# Patient Record
Sex: FEMALE | URBAN - METROPOLITAN AREA
[De-identification: names, ages, dates, MRNs, and addresses within clinical notes are randomized per-mention and may not be internally consistent; named-entity substitution may affect disease eponyms.]

---

## 2020-11-16 ENCOUNTER — APPOINTMENT (RX ONLY)
Dept: URBAN - METROPOLITAN AREA CLINIC 55 | Facility: CLINIC | Age: 45
Setting detail: DERMATOLOGY
End: 2020-11-16

## 2020-11-16 DIAGNOSIS — Z41.9 ENCOUNTER FOR PROCEDURE FOR PURPOSES OTHER THAN REMEDYING HEALTH STATE, UNSPECIFIED: ICD-10-CM

## 2020-11-16 PROCEDURE — ? COSMETIC CONSULTATION - KYBELLA

## 2020-11-16 PROCEDURE — ? COSMETIC CONSULTATION - ULTHERAPY

## 2020-12-02 ENCOUNTER — APPOINTMENT (RX ONLY)
Dept: URBAN - METROPOLITAN AREA CLINIC 55 | Facility: CLINIC | Age: 45
Setting detail: DERMATOLOGY
End: 2020-12-02

## 2020-12-02 DIAGNOSIS — Z41.9 ENCOUNTER FOR PROCEDURE FOR PURPOSES OTHER THAN REMEDYING HEALTH STATE, UNSPECIFIED: ICD-10-CM

## 2020-12-02 PROCEDURE — ? KYBELLA INJECTION

## 2020-12-02 PROCEDURE — ? BOTOX

## 2020-12-02 ASSESSMENT — LOCATION ZONE DERM
LOCATION ZONE: FACE
LOCATION ZONE: SCALP

## 2020-12-02 ASSESSMENT — LOCATION SIMPLE DESCRIPTION DERM
LOCATION SIMPLE: SUBMENTAL CHIN
LOCATION SIMPLE: SCALP

## 2020-12-02 ASSESSMENT — LOCATION DETAILED DESCRIPTION DERM
LOCATION DETAILED: SUBMENTAL CHIN
LOCATION DETAILED: LEFT CENTRAL OCCIPITAL SCALP

## 2020-12-02 NOTE — PROCEDURE: BOTOX
Show Topical Anesthesia: Yes
Masseter Units: 0
Price (Use Numbers Only, No Special Characters Or $): 053
Show Right And Left Pupillary Line Units: No
Expiration Date (Month Year): 09/30/23
Dilution (U/0.1 Cc): 4
Consent: Written consent obtained. Patient denies pregnancy and breastfeeding. Risks include but not limited to lid/brow ptosis, bruising, swelling, diplopia, temporary effect, incomplete chemical denervation.
Post-Care Instructions: Patient instructed to not lie down for 4 hours post procedure and informed not to manipulate treatment area for 4 hours. \\nRecommended follow up in 7 days if needed.
Lot #: [id number]
Detail Level: Detailed

## 2020-12-02 NOTE — PROCEDURE: KYBELLA INJECTION
Detail Level: Detailed
Treatment Area: Submental Chin
Consent: Written consent obtained. Risks include but not limited to bruising, beading, irregular texture, ulceration, infection, allergic reaction, scar formation, incomplete augmentation, temporary nature, procedural pain.
Treatment Number (Won't Render If 0): 1
Kybella Volume In Cc (Won't Render If 0): 0
Procedure Text: The treatment areas were cleansed. Kybella was administered using the parameters mentioned above.
Expiration Date (Month Year): 2/1/22
Anesthesia Volume In Cc: 0.5
Post-Care Instructions: Patient instructed to apply ice to reduce swelling.
Packages Used (Won't Render If 0 - Required If Using Inventory): 2
Lot #: [id number]

## 2021-01-06 ENCOUNTER — APPOINTMENT (RX ONLY)
Dept: URBAN - METROPOLITAN AREA CLINIC 55 | Facility: CLINIC | Age: 46
Setting detail: DERMATOLOGY
End: 2021-01-06

## 2021-01-06 DIAGNOSIS — Z41.9 ENCOUNTER FOR PROCEDURE FOR PURPOSES OTHER THAN REMEDYING HEALTH STATE, UNSPECIFIED: ICD-10-CM

## 2021-01-06 PROCEDURE — ? KYBELLA INJECTION

## 2021-01-06 NOTE — PROCEDURE: KYBELLA INJECTION
Expiration Date (Month Year): 2/2022
Procedure Text: The treatment areas were cleansed. Kybella was administered using the parameters mentioned above.
Number Of Grid Dots (Won't Render If 0): 0
Consent: Written consent obtained. Risks include but not limited to bruising, beading, irregular texture, ulceration, infection, allergic reaction, scar formation, incomplete augmentation, temporary nature, procedural pain.
Packages Used (Won't Render If 0 - Required If Using Inventory): 2
Lot #: [id number]
Treatment Area: Submental Chin
Detail Level: Detailed
Post-Care Instructions: Patient instructed to apply ice to reduce swelling.
Anesthesia Volume In Cc: 0.5

## 2021-02-10 ENCOUNTER — APPOINTMENT (RX ONLY)
Dept: URBAN - METROPOLITAN AREA CLINIC 55 | Facility: CLINIC | Age: 46
Setting detail: DERMATOLOGY
End: 2021-02-10

## 2021-02-10 DIAGNOSIS — Z41.9 ENCOUNTER FOR PROCEDURE FOR PURPOSES OTHER THAN REMEDYING HEALTH STATE, UNSPECIFIED: ICD-10-CM

## 2021-02-10 PROCEDURE — ? BOTOX

## 2021-02-10 PROCEDURE — ? KYBELLA INJECTION

## 2021-02-10 NOTE — PROCEDURE: KYBELLA INJECTION
Expiration Date (Month Year): 2/2022
Lot #: [id number]
Post-Care Instructions: Patient instructed to apply ice to reduce swelling.
Price (Use Numbers Only, No Special Characters Or $): 1200
Anesthesia Volume In Cc: 0.5
Consent: Written consent obtained. Risks include but not limited to bruising, beading, irregular texture, ulceration, infection, allergic reaction, scar formation, incomplete augmentation, temporary nature, procedural pain.
Treatment Number (Won't Render If 0): 3
Kybella Volume In Cc (Won't Render If 0): 0
Procedure Text: The treatment areas were cleansed. Kybella was administered using the parameters mentioned above.
Detail Level: Detailed
Treatment Area: Submental Chin
Packages Used (Won't Render If 0 - Required If Using Inventory): 2

## 2021-02-10 NOTE — PROCEDURE: BOTOX
Lot #: [id number]
Lateral Platysmal Bands Units: 0
Forehead Units: 4
Show Anterior Platysmal Band Units: Yes
Show Lcl Units: No
Glabellar Complex Units: 26
Additional Area 2 Location: chin
Price (Use Numbers Only, No Special Characters Or $): 102
Expiration Date (Month Year): 10/2023
Consent: Written consent obtained. Patient denies pregnancy and breastfeeding. Risks include but not limited to lid/brow ptosis, bruising, swelling, diplopia, temporary effect, incomplete chemical denervation.
Post-Care Instructions: Patient instructed to not lie down for 4 hours post procedure and informed not to manipulate treatment area for 4 hours. \\nRecommended follow up in 7 days if needed.
Detail Level: Detailed
Additional Area 1 Location: upper lip

## 2021-05-04 ENCOUNTER — APPOINTMENT (RX ONLY)
Dept: URBAN - METROPOLITAN AREA CLINIC 55 | Facility: CLINIC | Age: 46
Setting detail: DERMATOLOGY
End: 2021-05-04

## 2021-05-04 DIAGNOSIS — Z41.9 ENCOUNTER FOR PROCEDURE FOR PURPOSES OTHER THAN REMEDYING HEALTH STATE, UNSPECIFIED: ICD-10-CM

## 2021-05-04 PROCEDURE — ? DYSPORT

## 2021-05-04 NOTE — PROCEDURE: DYSPORT
Show Right And Left Pupillary Line Units: No
Consent: Written consent obtained. Risks include but not limited to lid/brow ptosis, bruising, swelling, diplopia, temporary effect, incomplete chemical denervation.
Left Pupillary Line Units: 0
Show Glabellar Units: Yes
Expiration Date (Month Year): 11/30/2021
Glabellar Complex Units: 65
Detail Level: Detailed
Additional Area 1 Location: lip
Price (Use Numbers Only, No Special Characters Or $): 384
Lot #: [id number]
Forehead Units: 15
Dilution (U/0.1 Cc): 10
Post-Care Instructions: Patient instructed to not lie down for 4 hours and limit physical activity for 24 hours. Follow up 7 days if needed.

## 2021-08-04 ENCOUNTER — APPOINTMENT (RX ONLY)
Dept: URBAN - METROPOLITAN AREA CLINIC 55 | Facility: CLINIC | Age: 46
Setting detail: DERMATOLOGY
End: 2021-08-04

## 2021-08-04 DIAGNOSIS — Z41.9 ENCOUNTER FOR PROCEDURE FOR PURPOSES OTHER THAN REMEDYING HEALTH STATE, UNSPECIFIED: ICD-10-CM

## 2021-08-04 PROCEDURE — ? DYSPORT

## 2021-08-04 NOTE — PROCEDURE: DYSPORT
Lot #: [id number]
Show Depressor Anguli Units: Yes
[facility] Units: 0
Consent: Written consent obtained. Risks include but not limited to lid/brow ptosis, bruising, swelling, diplopia, temporary effect, incomplete chemical denervation.
Additional Area 1 Location: lip
Post-Care Instructions: Patient instructed to not lie down for 4 hours and limit physical activity for 24 hours. Follow up 7 days if needed.
Show Mentalis Units: No
Price (Use Numbers Only, No Special Characters Or $): 384
Glabellar Complex Units: 65
Forehead Units: 15
Expiration Date (Month Year): 9/30/2021
Detail Level: Detailed
Dilution (U/0.1 Cc): 10

## 2021-10-06 ENCOUNTER — APPOINTMENT (RX ONLY)
Dept: URBAN - METROPOLITAN AREA CLINIC 55 | Facility: CLINIC | Age: 46
Setting detail: DERMATOLOGY
End: 2021-10-06

## 2021-10-06 DIAGNOSIS — Z41.9 ENCOUNTER FOR PROCEDURE FOR PURPOSES OTHER THAN REMEDYING HEALTH STATE, UNSPECIFIED: ICD-10-CM

## 2021-10-06 PROCEDURE — ? FILLERS

## 2021-10-06 NOTE — PROCEDURE: FILLERS
Additional Area 3 Volume In Cc: 0
Include Cannula Information In Note?: Yes
Include Cannula Size?: 27G
Include Cannula Information In Note?: No
Tear Troughs Filler Volume In Cc: 1
Filler: Restylane-L
Lot #: [id number]
Anesthesia Type: 1% lidocaine with epinephrine
Map Statment: See Attach Map for Complete Details
Consent: Written consent was obtained.
Expiration Date (Month Year): 11/2024
Detail Level: Simple
Post-Care Instructions: After care instructions were provided to the patient.
Anesthesia Volume In Cc: 0.5

## 2021-10-27 ENCOUNTER — APPOINTMENT (RX ONLY)
Dept: URBAN - METROPOLITAN AREA CLINIC 55 | Facility: CLINIC | Age: 46
Setting detail: DERMATOLOGY
End: 2021-10-27

## 2021-10-27 DIAGNOSIS — Z41.9 ENCOUNTER FOR PROCEDURE FOR PURPOSES OTHER THAN REMEDYING HEALTH STATE, UNSPECIFIED: ICD-10-CM

## 2021-10-27 PROCEDURE — ? DYSPORT

## 2021-10-27 NOTE — PROCEDURE: DYSPORT
Mentalis Units: 0
Detail Level: Detailed
Additional Area 1 Location: lip
Post-Care Instructions: Patient instructed to not lie down for 4 hours and limit physical activity for 24 hours. Follow up 7 days if needed.
Show Masseter Units: Yes
Show Mentalis Units: No
Lot #: [id number]
Consent: Written consent obtained. Risks include but not limited to lid/brow ptosis, bruising, swelling, diplopia, temporary effect, incomplete chemical denervation.
Glabellar Complex Units: 65
Forehead Units: 17.5
Expiration Date (Month Year): 4/30/2022
Dilution (U/0.1 Cc): 10
Price (Use Numbers Only, No Special Characters Or $): 396

## 2022-02-02 ENCOUNTER — APPOINTMENT (RX ONLY)
Dept: URBAN - METROPOLITAN AREA CLINIC 55 | Facility: CLINIC | Age: 47
Setting detail: DERMATOLOGY
End: 2022-02-02

## 2022-02-02 DIAGNOSIS — Z41.9 ENCOUNTER FOR PROCEDURE FOR PURPOSES OTHER THAN REMEDYING HEALTH STATE, UNSPECIFIED: ICD-10-CM

## 2022-02-02 PROCEDURE — ? DYSPORT

## 2022-06-03 ENCOUNTER — APPOINTMENT (RX ONLY)
Dept: URBAN - METROPOLITAN AREA CLINIC 55 | Facility: CLINIC | Age: 47
Setting detail: DERMATOLOGY
End: 2022-06-03

## 2022-06-03 DIAGNOSIS — Z41.9 ENCOUNTER FOR PROCEDURE FOR PURPOSES OTHER THAN REMEDYING HEALTH STATE, UNSPECIFIED: ICD-10-CM

## 2022-06-03 PROCEDURE — ? DYSPORT

## 2022-06-03 NOTE — PROCEDURE: DYSPORT
Dilution (U/0.1 Cc): 10
Show Levator Superior Units: Yes
Periorbital Skin Units: 0
Expiration Date (Month Year): 03/31/2022
Show Right And Left Periorbital Units: No
Forehead Units: 17.5
Additional Area 1 Location: Chin
Lot #: [id number]
Additional Area 2 Location: lip
Glabellar Complex Units: 75
Additional Area 4 Location: Nasalis
Additional Area 3 Location: masseter
Post-Care Instructions: Patient instructed to not lie down for 4 hours and limit physical activity for 24 hours.
Consent: Written consent obtained. Risks include but not limited to lid/brow ptosis, bruising, swelling, diplopia, temporary effect, incomplete chemical denervation.
Detail Level: Detailed

## 2022-06-23 ENCOUNTER — APPOINTMENT (RX ONLY)
Dept: URBAN - METROPOLITAN AREA CLINIC 55 | Facility: CLINIC | Age: 47
Setting detail: DERMATOLOGY
End: 2022-06-23

## 2022-06-23 DIAGNOSIS — Z41.9 ENCOUNTER FOR PROCEDURE FOR PURPOSES OTHER THAN REMEDYING HEALTH STATE, UNSPECIFIED: ICD-10-CM

## 2022-06-23 PROCEDURE — ? BOTOX

## 2022-06-23 NOTE — PROCEDURE: BOTOX
Additional Area 5 Location: Chin
Show Additional Area 2: Yes
Additional Area 4 Units: 0
Show Right And Left Brow Units: No
Detail Level: Detailed
Post-Care Instructions: Patient instructed to not lie down for 4 hours and limit physical activity for 24 hours.
Forehead Units: 2
Additional Area 3 Location: Nasalis
Lot #: [id number]
Expiration Date (Month Year): 05/31/24
Consent: Written consent obtained. Risks include but not limited to lid/brow ptosis, bruising, swelling, diplopia, temporary effect, incomplete chemical denervation.
Additional Area 4 Location: gummy smile
Dilution (U/0.1 Cc): 4
Additional Area 2 Location: Lip
Additional Area 1 Location: under eye “jelly roll”

## 2022-08-25 ENCOUNTER — APPOINTMENT (RX ONLY)
Dept: URBAN - METROPOLITAN AREA CLINIC 55 | Facility: CLINIC | Age: 47
Setting detail: DERMATOLOGY
End: 2022-08-25

## 2022-08-25 DIAGNOSIS — Z41.9 ENCOUNTER FOR PROCEDURE FOR PURPOSES OTHER THAN REMEDYING HEALTH STATE, UNSPECIFIED: ICD-10-CM

## 2022-08-25 PROCEDURE — ? DYSPORT

## 2022-08-25 PROCEDURE — ? INVENTORY

## 2022-08-25 NOTE — PROCEDURE: DYSPORT
Show Additional Area 4: Yes
Show Right And Left Brow Units: No
L Brow Units: 0
Post-Care Instructions: Patient instructed to not lie down for 4 hours and limit physical activity for 24 hours.
Forehead Units: 17.5
Detail Level: Detailed
Glabellar Complex Units: 65
Show Inventory Tab: Show
Consent obtained. Risks include but not limited to lid/brow ptosis, bruising, swelling, diplopia, temporary effect, incomplete chemical denervation.
Dilution (U/0.1 Cc): 10

## 2022-09-02 ENCOUNTER — APPOINTMENT (RX ONLY)
Dept: URBAN - METROPOLITAN AREA CLINIC 55 | Facility: CLINIC | Age: 47
Setting detail: DERMATOLOGY
End: 2022-09-02

## 2022-09-02 DIAGNOSIS — Z41.9 ENCOUNTER FOR PROCEDURE FOR PURPOSES OTHER THAN REMEDYING HEALTH STATE, UNSPECIFIED: ICD-10-CM

## 2022-09-02 PROCEDURE — ? DYSPORT

## 2022-09-02 PROCEDURE — ? INVENTORY

## 2022-09-02 NOTE — PROCEDURE: DYSPORT
Additional Area 5 Units: 0
Show Orbicularis Oculi Units: Yes
Forehead Units: 2.5
Show Mentalis Units: No
Detail Level: Detailed
Show Inventory Tab: Show
Consent obtained. Risks include but not limited to lid/brow ptosis, bruising, swelling, diplopia, temporary effect, incomplete chemical denervation.
Dilution (U/0.1 Cc): 10
Post-Care Instructions: Patient instructed to not lie down for 4 hours and limit physical activity for 24 hours.

## 2025-04-16 ENCOUNTER — APPOINTMENT (OUTPATIENT)
Dept: URBAN - METROPOLITAN AREA CLINIC 55 | Facility: CLINIC | Age: 50
Setting detail: DERMATOLOGY
End: 2025-04-16

## 2025-04-16 DIAGNOSIS — Z41.9 ENCOUNTER FOR PROCEDURE FOR PURPOSES OTHER THAN REMEDYING HEALTH STATE, UNSPECIFIED: ICD-10-CM

## 2025-04-16 PROCEDURE — ? DYSPORT

## 2025-04-16 PROCEDURE — ? INVENTORY

## 2025-04-16 NOTE — PROCEDURE: DYSPORT
Show Lcl Units: No
Show Forehead Units: Yes
L Brow Units: 0
Dilution (U/0.1 Cc): 10
Additional Area 2 Location: DLI
Detail Level: Detailed
Consent obtained. Risks include but not limited to lid/brow ptosis, bruising, swelling, diplopia, temporary effect, incomplete chemical denervation.
Post-Care Instructions: Patient instructed to not lie down for 4 hours and limit physical activity for 24 hours.
Additional Area 3 Location: Nasalis
Forehead Units: 30
Glabellar Complex Units: 65
Show Inventory Tab: Show
Additional Area 1 Location: lip

## 2025-07-21 ENCOUNTER — APPOINTMENT (OUTPATIENT)
Dept: URBAN - METROPOLITAN AREA CLINIC 55 | Facility: CLINIC | Age: 50
Setting detail: DERMATOLOGY
End: 2025-07-21

## 2025-07-21 DIAGNOSIS — Z41.9 ENCOUNTER FOR PROCEDURE FOR PURPOSES OTHER THAN REMEDYING HEALTH STATE, UNSPECIFIED: ICD-10-CM

## 2025-07-21 PROCEDURE — ? DYSPORT

## 2025-07-21 PROCEDURE — ? INVENTORY

## 2025-07-21 NOTE — PROCEDURE: DYSPORT
[facility] Units: 0
Dilution (U/0.1 Cc): 10
Show Levator Superior Units: Yes
Show Right And Left Brow Units: No
Additional Area 2 Location: DLI
Show Inventory Tab: Show
Additional Area 3 Location: Nasalis
Glabellar Complex Units: 65
Additional Area 1 Location: lip
Forehead Units: 30
Post-Care Instructions: Patient instructed to not lie down for 4 hours and limit physical activity for 24 hours.
Detail Level: Detailed
Consent obtained. Risks include but not limited to lid/brow ptosis, bruising, swelling, diplopia, temporary effect, incomplete chemical denervation.